# Patient Record
Sex: FEMALE | Race: WHITE | NOT HISPANIC OR LATINO | Employment: FULL TIME | URBAN - METROPOLITAN AREA
[De-identification: names, ages, dates, MRNs, and addresses within clinical notes are randomized per-mention and may not be internally consistent; named-entity substitution may affect disease eponyms.]

---

## 2017-05-16 ENCOUNTER — OFFICE VISIT (OUTPATIENT)
Dept: OBGYN | Facility: CLINIC | Age: 27
End: 2017-05-16
Attending: OBSTETRICS & GYNECOLOGY
Payer: COMMERCIAL

## 2017-05-16 VITALS
DIASTOLIC BLOOD PRESSURE: 72 MMHG | HEART RATE: 79 BPM | WEIGHT: 142.6 LBS | BODY MASS INDEX: 24.34 KG/M2 | HEIGHT: 64 IN | SYSTOLIC BLOOD PRESSURE: 116 MMHG

## 2017-05-16 DIAGNOSIS — N83.201 RIGHT OVARIAN CYST: Primary | ICD-10-CM

## 2017-05-16 PROCEDURE — 99212 OFFICE O/P EST SF 10 MIN: CPT | Mod: ZF

## 2017-05-16 ASSESSMENT — ENCOUNTER SYMPTOMS
HOARSE VOICE: 0
JOINT SWELLING: 1
HALLUCINATIONS: 0
POLYDIPSIA: 1
TACHYCARDIA: 0
EYE IRRITATION: 1
SPEECH CHANGE: 0
POOR WOUND HEALING: 0
SMELL DISTURBANCE: 0
POLYPHAGIA: 0
SINUS PAIN: 0
MYALGIAS: 1
STIFFNESS: 1
SINUS CONGESTION: 1
LOSS OF CONSCIOUSNESS: 0
DISTURBANCES IN COORDINATION: 1
EYE REDNESS: 0
LEG SWELLING: 1
LEG PAIN: 1
WEAKNESS: 0
DECREASED APPETITE: 0
SORE THROAT: 1
TREMORS: 0
DECREASED LIBIDO: 0
CLAUDICATION: 1
TASTE DISTURBANCE: 0
LIGHT-HEADEDNESS: 1
NECK MASS: 0
EXERCISE INTOLERANCE: 0
EYE WATERING: 1
HOT FLASHES: 1
NAIL CHANGES: 0
NECK PAIN: 1
NUMBNESS: 1
MUSCLE CRAMPS: 0
PALPITATIONS: 1
SKIN CHANGES: 0
CHILLS: 1
NIGHT SWEATS: 1
HYPOTENSION: 1
WEIGHT GAIN: 0
FATIGUE: 1
EYE PAIN: 0
HEADACHES: 1
HYPERTENSION: 0
FEVER: 0
ALTERED TEMPERATURE REGULATION: 1
BACK PAIN: 1
ARTHRALGIAS: 1
SLEEP DISTURBANCES DUE TO BREATHING: 0
TINGLING: 1
DIZZINESS: 1
SYNCOPE: 0
WEIGHT LOSS: 0
PARALYSIS: 0
MEMORY LOSS: 0
ORTHOPNEA: 0
MUSCLE WEAKNESS: 0
INCREASED ENERGY: 0
DOUBLE VISION: 0
TROUBLE SWALLOWING: 0
SEIZURES: 0

## 2017-05-16 ASSESSMENT — ANXIETY QUESTIONNAIRES
GAD7 TOTAL SCORE: 5
7. FEELING AFRAID AS IF SOMETHING AWFUL MIGHT HAPPEN: 0 = NOT AT ALL
GAD7 TOTAL SCORE: 5

## 2017-05-16 NOTE — MR AVS SNAPSHOT
"              After Visit Summary   5/16/2017    Ellie Villavicencio    MRN: [id number]           Patient Information     Date Of Birth          1990        Visit Information        Provider Department      5/16/2017 11:00 AM Velvet Sahni MD [facility]        Today's Diagnoses     Right ovarian cyst    -  1       Follow-ups after your visit        Who to contact     Please call your clinic at [phone] to:    Ask questions about your health    Make or cancel appointments    Discuss your medicines    Learn about your test results    Speak to your doctor   If you have compliments or concerns about an experience at your clinic, or if you wish to file a complaint, please contact [facility] Patient Relations at [phone] or email us at [email or web address]         Additional Information About Your Visit        MyChart Information     [employer] gives you secure access to your electronic health record. If you see a primary care provider, you can also send messages to your care team and make appointments. If you have questions, please call your primary care clinic.  If you do not have a primary care provider, please call [phone] and they will assist you.      [employer] is an electronic gateway that provides easy, online access to your medical records. With [employer], you can request a clinic appointment, read your test results, renew a prescription or communicate with your care team.     To access your existing account, please contact your [facility] or call [phone] for assistance.        Care EveryWhere ID     This is your Care EveryWhere ID. This could be used by other organizations to access your [place] medical records  [id number]        Your Vitals Were     Pulse Height Last Period Breastfeeding? BMI (Body Mass Index)       79 1.626 m (5' 4\") 05/07/2017 (Approximate) No 24.48 kg/m2        Blood Pressure " from Last 3 Encounters:   05/16/17 116/72   10/21/13 108/64   09/10/13 109/59    Weight from Last 3 Encounters:   05/16/17 64.7 kg (142 lb 9.6 oz)   10/21/13 62.1 kg (137 lb)   09/10/13 60.8 kg (134 lb)              We Performed the Following     Daniella-Operative Worksheet (Operative Laparoscopy)        Primary Care Provider Office Phone # Fax #    Sheree Ismael Cui [phone]       [facility] [address] [place] [id number]        Thank you!     Thank you for choosing [facility]  for your care. Our goal is always to provide you with excellent care. Hearing back from our patients is one way we can continue to improve our services. Please take a few minutes to complete the written survey that you may receive in the mail after your visit with us. Thank you!             Your Updated Medication List - Protect others around you: Learn how to safely use, store and throw away your medicines at www.disposemymeds.org.          This list is accurate as of: 5/16/17 11:59 PM.  Always use your most recent med list.                   Brand Name Dispense Instructions for use    etonogestrel 68 MG Impl    IMPLANON/NEXPLANON

## 2017-05-16 NOTE — LETTER
2017       RE: Ellie Villavicencio  [address] [facility] [address]     Dear Colleague,    Thank you for referring your patient, Ellie Villavicencio, to the [facility] at [facility]. Please see a copy of my visit note below.    27 yo  w/ incidentally found Right ovarian cyst, during evaluation via MRI of left hip/groin pain.    She has had ongoing issues w/ her hips.  The MRI results are available in care everywhere from [facility], and shows 4 cm right sided ovarian mass.  Characteristics include well circumscribed and containing lipid, which is c/w dermoid cyst.     She states she has had ongoing b/l hip pain and some pelvic pain.  Not specifically painful w/ sex.     No irregular bleeding.  Has nexplanon for contraception. Hopes one day to have babies, but not currently.    Pap UTD from primary clinic.     Past Medical History:   Diagnosis Date     Fibroadenoma of right breast      Seizures (H)     Unsure exactly when my last seizure was.     Past Surgical History:   Procedure Laterality Date     BREAST SURGERY      Lump biopsy-non cancerous     DENTAL SURGERY      [place] tooth extraction     TONSILLECTOMY       FH: no details re: PMH h/o ovarian cancer.   Uncertain of type of breast cancer, but mother was diagnosed at fairly young age    Family History   Problem Relation Age of Onset     Genitourinary Problems Mother      Breast Cancer Mother      once     Arthritis Father      Neurologic Disorder Father 50     brain tumor     Hypertension Father      Cardiovascular Maternal Grandfather      Hypertension Maternal Grandfather      Prostate Cancer Maternal Grandfather      Musculoskeletal Disorder Maternal Grandfather      Hypertension Brother      Breast Cancer Paternal Grandmother      twice     Arthritis Paternal Grandmother      GASTROINTESTINAL DISEASE Paternal Grandmother      Genitourinary Problems Paternal Grandmother       "Other Cancer Paternal Grandmother      Ovarian Cancer     OSTEOPOROSIS Paternal Grandmother      Musculoskeletal Disorder Maternal Grandmother      Asthma Brother      Hypertension Brother      Asthma Brother         ROS: 10 point ROS neg other than the symptoms noted above in the HPI.      Current Outpatient Prescriptions:      etonogestrel (IMPLANON/NEXPLANON) 68 MG IMPL, , Disp: , Rfl:   Has tapered off seizure meds per report.     O:   Vitals:    05/16/17 1108   BP: 116/72   BP Location: Left arm   Patient Position: Chair   Pulse: 79   Weight: 64.7 kg (142 lb 9.6 oz)   Height: 1.626 m (5' 4\")     AA nad  Abd: soft, NT/ND   Gyn: normal external genitalia.    Spec exam shows normal cervix. No d/c or bleeding.  Bimanual exam reveals mild tenderness and fullness in right adnexa.  No fixed or tethered concerns on exam.    A/p: 27 yo nulligravid w/ Right ovarian mass c/w dermoid- desires removal.    We discussed surgical options for cystectomy and goal of cyst excision via laparoscopy. We discussed possibility of needing to convert to open.  We discussed possibility of need for oophorectomy or salpingectomy if necessary to complete surgery. We discussed fertility rates s/p surgery were equivalent, even if oophorectomy wwas required.  We discussed usual hospital course being outpt w/ d/c same day if done laparoscopically.     Orders Placed This Encounter   Procedures     Daniella-Operative Worksheet (Operative Laparoscopy)     Velvet Sahni MD, FACOG  [facility]  OB/GYN  "

## 2017-05-16 NOTE — NURSING NOTE
Chief Complaint   Patient presents with     Consult For     4cm mass on right ovary       See SHAWN Tilley 5/16/2017

## 2017-05-17 ASSESSMENT — ANXIETY QUESTIONNAIRES: GAD7 TOTAL SCORE: 5

## 2017-05-19 ENCOUNTER — TELEPHONE (OUTPATIENT)
Dept: OBGYN | Facility: CLINIC | Age: 27
End: 2017-05-19

## 2017-05-19 NOTE — PROGRESS NOTES
25 yo  w/ incidentally found Right ovarian cyst, during evaluation via MRI of left hip/groin pain.    She has had ongoing issues w/ her hips.  The MRI results are available in care everywhere from [facility], and shows 4 cm right sided ovarian mass.  Characteristics include well circumscribed and containing lipid, which is c/w dermoid cyst.     She states she has had ongoing b/l hip pain and some pelvic pain.  Not specifically painful w/ sex.     No irregular bleeding.  Has nexplanon for contraception. Hopes one day to have babies, but not currently.    Pap UTD from primary clinic.     Past Medical History:   Diagnosis Date     Fibroadenoma of right breast      Seizures (H)     Unsure exactly when my last seizure was.     Past Surgical History:   Procedure Laterality Date     BREAST SURGERY      Lump biopsy-non cancerous     DENTAL SURGERY      [place] tooth extraction     TONSILLECTOMY       FH: no details re: PMH h/o ovarian cancer.   Uncertain of type of breast cancer, but mother was diagnosed at fairly young age    Family History   Problem Relation Age of Onset     Genitourinary Problems Mother      Breast Cancer Mother      once     Arthritis Father      Neurologic Disorder Father 50     brain tumor     Hypertension Father      Cardiovascular Maternal Grandfather      Hypertension Maternal Grandfather      Prostate Cancer Maternal Grandfather      Musculoskeletal Disorder Maternal Grandfather      Hypertension Brother      Breast Cancer Paternal Grandmother      twice     Arthritis Paternal Grandmother      GASTROINTESTINAL DISEASE Paternal Grandmother      Genitourinary Problems Paternal Grandmother      Other Cancer Paternal Grandmother      Ovarian Cancer     OSTEOPOROSIS Paternal Grandmother      Musculoskeletal Disorder Maternal Grandmother      Asthma Brother      Hypertension Brother      Asthma Brother         ROS: 10 point ROS neg other than the symptoms noted above in the  "HPI.      Current Outpatient Prescriptions:      etonogestrel (IMPLANON/NEXPLANON) 68 MG IMPL, , Disp: , Rfl:   Has tapered off seizure meds per report.     O:   Vitals:    05/16/17 1108   BP: 116/72   BP Location: Left arm   Patient Position: Chair   Pulse: 79   Weight: 64.7 kg (142 lb 9.6 oz)   Height: 1.626 m (5' 4\")     AA nad  Abd: soft, NT/ND   Gyn: normal external genitalia.    Spec exam shows normal cervix. No d/c or bleeding.  Bimanual exam reveals mild tenderness and fullness in right adnexa.  No fixed or tethered concerns on exam.    A/p: 25 yo nulligravid w/ Right ovarian mass c/w dermoid- desires removal.    We discussed surgical options for cystectomy and goal of cyst excision via laparoscopy. We discussed possibility of needing to convert to open.  We discussed possibility of need for oophorectomy or salpingectomy if necessary to complete surgery. We discussed fertility rates s/p surgery were equivalent, even if oophorectomy wwas required.  We discussed usual hospital course being outpt w/ d/c same day if done laparoscopically.     Orders Placed This Encounter   Procedures     Daniella-Operative Worksheet (Operative Laparoscopy)     Velvet Sahni MD, FACOG  [facility]  OB/GYN    5/19/2017  11:27 AM          Answers for HPI/ROS submitted by the patient on 5/16/2017   KYLER 7 TOTAL SCORE: 5  Q1: Little interest or pleasure in doing things: 0=Not at all  Q2: Feeling down, depressed or hopeless: 0=Not at all  PHQ-2 Score: 0  General Symptoms: Yes  Skin Symptoms: Yes  HENT Symptoms: Yes  EYE SYMPTOMS: Yes  HEART SYMPTOMS: Yes  LUNG SYMPTOMS: No  INTESTINAL SYMPTOMS: No  URINARY SYMPTOMS: No  GYNECOLOGIC SYMPTOMS: Yes  BREAST SYMPTOMS: No  SKELETAL SYMPTOMS: Yes  BLOOD SYMPTOMS: No  NERVOUS SYSTEM SYMPTOMS: Yes  MENTAL HEALTH SYMPTOMS: No  Fever: No  Loss of appetite: No  Weight loss: No  Weight gain: No  Fatigue: Yes  Night sweats: Yes  Chills: Yes  Increased stress: Yes  Excessive " hunger: No  Excessive thirst: Yes  Feeling hot or cold when others believe the temperature is normal: Yes  Loss of height: No  Post-operative complications: No  Surgical site pain: No  Hallucinations: No  Change in or Loss of Energy: No  Hyperactivity: No  Confusion: No  Changes in hair: Yes  Changes in moles/birth marks: No  Itching: No  Rashes: Yes  Changes in nails: No  Acne: Yes  Hair in places you don't want it: No  Change in facial hair: No  Warts: No  Non-healing sores: No  Scarring: No  Flaking of skin: Yes  Color changes of hands/feet in cold : No  Sun sensitivity: No  Skin thickening: No  Ear pain: Yes  Ear discharge: Yes  Hearing loss: No  Tinnitus: Yes  Nosebleeds: No  Congestion: Yes  Sinus pain: No  Trouble swallowing: No   Voice hoarseness: No  Mouth sores: No  Sore throat: Yes  Tooth pain: No  Gum tenderness: No  Bleeding gums: No  Change in taste: No  Change in sense of smell: No  Dry mouth: No  Hearing aid used: No  Neck lump: No  Eye pain: No  Vision loss: No  Dry eyes: No  Watery eyes: Yes  Eye bulging: No  Double vision: No  Flashing of lights: No  Spots: Yes  Floaters: No  Redness: No  Crossed eyes: No  Tunnel Vision: No  Yellowing of eyes: No  Eye irritation: Yes  Chest pain or pressure: No  Fast or irregular heartbeat: Yes  Pain in legs with walking: Yes  Swelling in feet or ankles: Yes  Trouble breathing while lying down: No  Fingers or Toes appear blue: No  High blood pressure: No  Low blood pressure: Yes  Fainting: No  Murmurs: No  Chest pain on exertion: No  Chest pain at rest: No  Cramping pain in leg during exercise: Yes  Pacemaker: No  Varicose veins: Yes  Edema or swelling: Yes  Fast heart beat: No  Wake up at night with shortness of breath: No  Heart flutters: Yes  Light-headedness: Yes  Exercise intolerance: No  Back pain: Yes  Muscle aches: Yes  Neck pain: Yes  Swollen joints: Yes  Joint pain: Yes  Bone pain: No  Muscle cramps: No  Muscle weakness: No  Joint stiffness: Yes  Bone  fracture: No  Trouble with coordination: Yes  Dizziness or trouble with balance: Yes  Fainting or black-out spells: No  Memory loss: No  Headache: Yes  Seizures: No  Speech problems: No  Tingling: Yes  Tremor: No  Weakness: No  Difficulty walking: No  Paralysis: No  Numbness: Yes  Bleeding or spotting between periods: Yes  Heavy or painful periods: Yes  Irregular periods: Yes  Vaginal discharge: No  Hot flashes: Yes  Vaginal dryness: No  Genital ulcers: No  Reduced libido: No  Painful intercourse: Yes  Difficulty with sexual arousal: No  Post-menopausal bleeding: Yes

## 2017-05-25 ENCOUNTER — TELEPHONE (OUTPATIENT)
Dept: OBGYN | Facility: CLINIC | Age: 27
End: 2017-05-25

## 2017-05-25 ENCOUNTER — HOSPITAL ENCOUNTER (OUTPATIENT)
Facility: CLINIC | Age: 27
End: 2017-05-25
Attending: OBSTETRICS & GYNECOLOGY | Admitting: OBSTETRICS & GYNECOLOGY
Payer: COMMERCIAL

## 2017-05-25 NOTE — TELEPHONE ENCOUNTER
----- Message from Collins Garnett sent at 5/25/2017 12:30 PM CDT -----  Regarding: Call pt- question about surgery   Contact: [phone]  Pt would like a call back from Dr. Sahni, she has questions about her upcoming surgery. She can be reached at [phone]. Thank you.    GY    Please DO NOT send this message and/or reply back to sender.  Call Center Representatives DO NOT respond to messages.

## 2017-05-25 NOTE — TELEPHONE ENCOUNTER
Confirmed surgery date 8/1/17 with arrival time at 6:30a.m with nothing to eat eight hours before scheduled surgery time and clear liquids up to two hours before scheduled surgery time, informed patient that she will need to sched a pre op physical within thirty days of surgery and that a map and letter will be mailed out.     to complete the following fields:            CHECKLIST     Google Calendar : Yes     Resident notified: Not Applicable     Clinic schedule blocked:  Not Applicable    Patient notified:Yes      Pre op information sent: Yes     Given to patient over the phone.Yes    Comments:

## 2017-05-25 NOTE — TELEPHONE ENCOUNTER
Spoke with Ellie who is wondering if there is any way her surgery can be moved up sooner than August 1st. She states she is willing to see a different provider if Dr. Sahni recommends this as well. Advised that we can discuss with the surgery scheduler to see if anything becomes available and can also send message to Dr. Sahni. She understood plan and had no further questions.

## 2017-11-26 ENCOUNTER — HEALTH MAINTENANCE LETTER (OUTPATIENT)
Age: 27
End: 2017-11-26

## 2020-03-02 ENCOUNTER — HEALTH MAINTENANCE LETTER (OUTPATIENT)
Age: 30
End: 2020-03-02

## 2020-12-20 ENCOUNTER — HEALTH MAINTENANCE LETTER (OUTPATIENT)
Age: 30
End: 2020-12-20

## 2021-04-18 ENCOUNTER — HEALTH MAINTENANCE LETTER (OUTPATIENT)
Age: 31
End: 2021-04-18

## 2021-10-03 ENCOUNTER — HEALTH MAINTENANCE LETTER (OUTPATIENT)
Age: 31
End: 2021-10-03

## 2022-05-14 ENCOUNTER — HEALTH MAINTENANCE LETTER (OUTPATIENT)
Age: 32
End: 2022-05-14

## 2022-08-01 ENCOUNTER — HOSPITAL ENCOUNTER (EMERGENCY)
Facility: CLINIC | Age: 32
Discharge: HOME | End: 2022-08-01
Attending: EMERGENCY MEDICINE | Admitting: EMERGENCY MEDICINE
Payer: COMMERCIAL

## 2022-08-01 ENCOUNTER — APPOINTMENT (OUTPATIENT)
Dept: CT IMAGING | Facility: CLINIC | Age: 32
End: 2022-08-01
Attending: EMERGENCY MEDICINE
Payer: COMMERCIAL

## 2022-08-01 VITALS
RESPIRATION RATE: 16 BRPM | SYSTOLIC BLOOD PRESSURE: 128 MMHG | HEART RATE: 65 BPM | DIASTOLIC BLOOD PRESSURE: 80 MMHG | OXYGEN SATURATION: 100 % | TEMPERATURE: 98.4 F

## 2022-08-01 DIAGNOSIS — G40.909 SEIZURE DISORDER (H): ICD-10-CM

## 2022-08-01 LAB
ALBUMIN SERPL BCG-MCNC: 4.9 G/DL (ref 3.5–5.2)
ALP SERPL-CCNC: 67 U/L (ref 35–104)
ALT SERPL W P-5'-P-CCNC: 22 U/L (ref 10–35)
ANION GAP SERPL CALCULATED.3IONS-SCNC: 10 MMOL/L (ref 7–15)
AST SERPL W P-5'-P-CCNC: 32 U/L (ref 10–35)
BASOPHILS # BLD AUTO: 0 10E3/UL (ref 0–0.2)
BASOPHILS NFR BLD AUTO: 0 %
BILIRUB SERPL-MCNC: 0.6 MG/DL
BUN SERPL-MCNC: 8.4 MG/DL (ref 6–20)
CALCIUM SERPL-MCNC: 9.3 MG/DL (ref 8.6–10)
CHLORIDE SERPL-SCNC: 102 MMOL/L (ref 98–107)
CREAT SERPL-MCNC: 0.8 MG/DL (ref 0.51–0.95)
DEPRECATED HCO3 PLAS-SCNC: 26 MMOL/L (ref 22–29)
EOSINOPHIL # BLD AUTO: 0 10E3/UL (ref 0–0.7)
EOSINOPHIL NFR BLD AUTO: 0 %
ERYTHROCYTE [DISTWIDTH] IN BLOOD BY AUTOMATED COUNT: 12 % (ref 10–15)
GFR SERPL CREATININE-BSD FRML MDRD: >90 ML/MIN/1.73M2
GLUCOSE SERPL-MCNC: 134 MG/DL (ref 70–99)
HCG SERPL QL: NEGATIVE
HCO3 BLDV-SCNC: 28 MMOL/L (ref 21–28)
HCT VFR BLD AUTO: 40.6 % (ref 35–47)
HGB BLD-MCNC: 13.9 G/DL (ref 11.7–15.7)
IMM GRANULOCYTES # BLD: 0 10E3/UL
IMM GRANULOCYTES NFR BLD: 0 %
LACTATE BLD-SCNC: 1.8 MMOL/L
LYMPHOCYTES # BLD AUTO: 0.9 10E3/UL (ref 0.8–5.3)
LYMPHOCYTES NFR BLD AUTO: 12 %
MAGNESIUM SERPL-MCNC: 2.2 MG/DL (ref 1.7–2.3)
MCH RBC QN AUTO: 33.3 PG (ref 26.5–33)
MCHC RBC AUTO-ENTMCNC: 34.2 G/DL (ref 31.5–36.5)
MCV RBC AUTO: 97 FL (ref 78–100)
MONOCYTES # BLD AUTO: 0.4 10E3/UL (ref 0–1.3)
MONOCYTES NFR BLD AUTO: 5 %
NEUTROPHILS # BLD AUTO: 6.1 10E3/UL (ref 1.6–8.3)
NEUTROPHILS NFR BLD AUTO: 83 %
NRBC # BLD AUTO: 0 10E3/UL
NRBC BLD AUTO-RTO: 0 /100
PCO2 BLDV: 46 MM HG (ref 40–50)
PH BLDV: 7.4 [PH] (ref 7.32–7.43)
PLATELET # BLD AUTO: 273 10E3/UL (ref 150–450)
PO2 BLDV: 29 MM HG (ref 25–47)
POTASSIUM SERPL-SCNC: 3.8 MMOL/L (ref 3.4–5.3)
PROT SERPL-MCNC: 7.2 G/DL (ref 6.4–8.3)
RBC # BLD AUTO: 4.18 10E6/UL (ref 3.8–5.2)
SAO2 % BLDV: 54 % (ref 94–100)
SODIUM SERPL-SCNC: 138 MMOL/L (ref 136–145)
WBC # BLD AUTO: 7.4 10E3/UL (ref 4–11)

## 2022-08-01 PROCEDURE — 99284 EMERGENCY DEPT VISIT MOD MDM: CPT | Performed by: EMERGENCY MEDICINE

## 2022-08-01 PROCEDURE — 99284 EMERGENCY DEPT VISIT MOD MDM: CPT | Mod: 25 | Performed by: EMERGENCY MEDICINE

## 2022-08-01 PROCEDURE — 80053 COMPREHEN METABOLIC PANEL: CPT | Performed by: EMERGENCY MEDICINE

## 2022-08-01 PROCEDURE — 84703 CHORIONIC GONADOTROPIN ASSAY: CPT | Performed by: EMERGENCY MEDICINE

## 2022-08-01 PROCEDURE — 36415 COLL VENOUS BLD VENIPUNCTURE: CPT | Performed by: EMERGENCY MEDICINE

## 2022-08-01 PROCEDURE — 70450 CT HEAD/BRAIN W/O DYE: CPT

## 2022-08-01 PROCEDURE — 85025 COMPLETE CBC W/AUTO DIFF WBC: CPT | Performed by: EMERGENCY MEDICINE

## 2022-08-01 PROCEDURE — 70450 CT HEAD/BRAIN W/O DYE: CPT | Mod: 26 | Performed by: RADIOLOGY

## 2022-08-01 PROCEDURE — 82803 BLOOD GASES ANY COMBINATION: CPT

## 2022-08-01 PROCEDURE — 83735 ASSAY OF MAGNESIUM: CPT | Performed by: EMERGENCY MEDICINE

## 2022-08-01 RX ORDER — LACOSAMIDE 100 MG/1
100 TABLET ORAL 2 TIMES DAILY
Qty: 42 TABLET | Refills: 0 | Status: SHIPPED | OUTPATIENT
Start: 2022-08-01

## 2022-08-01 NOTE — ED PROVIDER NOTES
ED Provider Note  [facility]      History   No chief complaint on file.    HPI  Ellie Casillas is a 31 year old female with a history of epilepsy who presents with seizures.  She reports she initially had a grand mal seizure 10 years ago.  She had not had any until 6 weeks ago when she was positive for COVID.  At that time she had 1 grand mal seizure.  She had been well until today when she had 2 seizures.  Both seizures occurred when she was lying in bed in her grandma activity.  She denies any falls or injuries.  She does report a headache that has developed following her most recent seizure.  She denies any nausea, vomiting.  She does report some diarrhea recently.  Denies any recent fevers, cough, chest pain or shortness of breath.  Denies any dysuria or increased urinary frequency.  She is currently on her menstrual cycle.  She is not currently on any antiepileptics.  She had plan to follow-up in [place] to discuss potentially restarting antiepileptics but was not able to follow-up for quite some time.  She was previously on Keppra, Vimpat and lamotrigine.  She reports sleeping well recently.  Drinks alcohol approximately twice a week and smokes marijuana approximately 4 times a week.     Past Medical History  Past Medical History:   Diagnosis Date     Fibroadenoma of right breast 2012     Seizures (H) 2012    Unsure exactly when my last seizure was.     Past Surgical History:   Procedure Laterality Date     BREAST SURGERY  2012    Lump biopsy-non cancerous     DENTAL SURGERY  2000    [place] tooth extraction     TONSILLECTOMY  1995     etonogestrel (IMPLANON/NEXPLANON) 68 MG IMPL      Allergies   Allergen Reactions     Lactose      Family History  Family History   Problem Relation Age of Onset     Genitourinary Problems Mother      Breast Cancer Mother         once     Arthritis Father      Neurologic Disorder Father 50        brain tumor     Hypertension Father      Cardiovascular  Maternal Grandfather      Hypertension Maternal Grandfather      Prostate Cancer Maternal Grandfather      Musculoskeletal Disorder Maternal Grandfather      Hypertension Brother      Breast Cancer Paternal Grandmother         twice     Arthritis Paternal Grandmother      Gastrointestinal Disease Paternal Grandmother      Genitourinary Problems Paternal Grandmother      Other Cancer Paternal Grandmother         Ovarian Cancer     Osteoporosis Paternal Grandmother      Musculoskeletal Disorder Maternal Grandmother      Asthma Brother      Hypertension Brother      Asthma Brother      Social History   Social History     Tobacco Use     Smoking status: Never Smoker     Smokeless tobacco: Never Used   Substance Use Topics     Alcohol use: Yes     Drug use: No      Past medical history, past surgical history, medications, allergies, family history, and social history were reviewed with the patient. No additional pertinent items.       Review of Systems  A complete review of systems was performed with pertinent positives and negatives noted in the HPI, and all other systems negative.    Physical Exam   BP: 119/63  Pulse: 92  Temp: 98.4  F (36.9  C)  SpO2: 99 %  Physical Exam  General: patient is alert and oriented and in no acute distress   Head: atraumatic and normocephalic   EENT: moist mucus membranes without tonsillar erythema or exudates, pupils round and reactive   Neck: supple   Cardiovascular: regular rate and rhythm, extremities warm and well perfused, no lower extremity edema  Pulmonary: lungs clear to auscultation bilaterally   Abdomen: soft, non-tender   Musculoskeletal: normal range of motion   Neurological: alert and oriented, moving all extremities symmetrically, gait normal   Skin: warm, dry   ED Course      Procedures                     No results found for any visits on 08/01/22.  Medications - No data to display     Assessments & Plan (with Medical Decision Making)   Ms. Casillas is a 31 year old female  with a history of epilepsy who presents with seizures.  Currently she is hemodynamically stable, afebrile and in no respiratory distress.  She is neurologically intact.  She was seen in triage and baseline labs as well as head CT ordered.  Patient is appropriate to wait in the waiting room at this time.  She will need follow-up on her labs as well as possible neurology consultation for reinitiation of antiepileptics.    Re-evaluation: Laboratory evaluation demonstrates no electrolyte abnormalities.  No leukocytosis, normal hemoglobin, pregnancy test is negative.  She did have a head CT which is unremarkable.  She denies any other infectious symptoms including any cough, abdominal pain or urinary symptoms.  Discussed with neurology regarding antiepileptic recommendations and recommended reinitiating on Vimpat.  Referral was placed for follow-up in epilepsy clinic.  She was instructed on avoiding driving for the next 3 months.  She was given close return precautions for the emergency department and voiced understanding.    I have reviewed the nursing notes. I have reviewed the findings, diagnosis, plan and need for follow up with the patient.    New Prescriptions    LACOSAMIDE (VIMPAT) 100 MG TABS TABLET    Take 1 tablet (100 mg) by mouth 2 times daily       Final diagnoses:   Seizure disorder (H)       --  Argenis Esteves MD  [facility] EMERGENCY DEPARTMENT  8/1/2022     Argenis Esteves MD  08/01/22 7035

## 2022-08-01 NOTE — ED TRIAGE NOTES
Pt has hx of seizure 10 years ago and was able to wean off of seizure medications. Patient has covid 6 weeks ago and has had a few seizures since then.      Triage Assessment     Row Name 08/01/22 1246       Triage Assessment (Adult)    Airway WDL WDL       Respiratory WDL    Respiratory WDL WDL       Skin Circulation/Temperature WDL    Skin Circulation/Temperature WDL WDL       Cardiac WDL    Cardiac WDL WDL       Peripheral/Neurovascular WDL    Peripheral Neurovascular WDL WDL

## 2022-08-01 NOTE — DISCHARGE INSTRUCTIONS
Follow up in neurology clinic within the next 2-4 weeks.  [facility] will call you to coordinate your care as prescribed by your provider. If you don't hear from a representative within 2 business days, please call [phone].    Begin taking Vimpat 100 mg twice a day until neurology clinic follow-up.  Avoid driving for the next 3 months.  Avoid doing other activities that would be dangerous if you have a recurrent seizure such as swimming or working with heavy machinery.    If you have any worsening symptoms including recurrent seizures, fevers, shortness of breath or other concerns return to the emergency department for reevaluation.

## 2022-09-04 ENCOUNTER — HEALTH MAINTENANCE LETTER (OUTPATIENT)
Age: 32
End: 2022-09-04

## 2024-07-07 ENCOUNTER — HEALTH MAINTENANCE LETTER (OUTPATIENT)
Age: 34
End: 2024-07-07